# Patient Record
Sex: FEMALE | Race: WHITE
[De-identification: names, ages, dates, MRNs, and addresses within clinical notes are randomized per-mention and may not be internally consistent; named-entity substitution may affect disease eponyms.]

---

## 2019-03-24 ENCOUNTER — HOSPITAL ENCOUNTER (EMERGENCY)
Dept: HOSPITAL 10 - FTE | Age: 39
Discharge: HOME | End: 2019-03-24
Payer: MEDICAID

## 2019-03-24 ENCOUNTER — HOSPITAL ENCOUNTER (EMERGENCY)
Dept: HOSPITAL 91 - FTE | Age: 39
Discharge: HOME | End: 2019-03-24
Payer: MEDICAID

## 2019-03-24 VITALS — DIASTOLIC BLOOD PRESSURE: 98 MMHG | HEART RATE: 79 BPM | SYSTOLIC BLOOD PRESSURE: 168 MMHG | RESPIRATION RATE: 18 BRPM

## 2019-03-24 VITALS — WEIGHT: 231.49 LBS | BODY MASS INDEX: 53.57 KG/M2 | HEIGHT: 55 IN

## 2019-03-24 DIAGNOSIS — S53.402A: ICD-10-CM
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
## 2019-03-24 DIAGNOSIS — S83.92XA: Primary | ICD-10-CM

## 2019-03-24 DIAGNOSIS — I10: ICD-10-CM
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
## 2019-03-24 PROCEDURE — 99283 EMERGENCY DEPT VISIT LOW MDM: CPT

## 2019-03-24 PROCEDURE — 29505 APPLICATION LONG LEG SPLINT: CPT

## 2019-03-24 PROCEDURE — 73080 X-RAY EXAM OF ELBOW: CPT

## 2019-03-24 PROCEDURE — 73562 X-RAY EXAM OF KNEE 3: CPT

## 2019-03-24 NOTE — ERD
ER Documentation


Chief Complaint


Chief Complaint





LEFT KNEE PAIN AND LEFT ELBOW PAIN FROM A GLF 1 HR PTA.





HPI


This is a 38-year-old female patient that presents to the emergency room with pa


inful left elbow and left knee status post falling while wearing high heels in 


[place] approximately 2 hours PTA.  Patient states she had difficulty getting up 


due to pain now complains of sharp pain in elbow with some numbness to her 


forearm.  Also has pain to her left knee states she is unable to ambulate.  


States she has had ligamentous injuries to this knee in the past.  History 


significant for hypertension and diabetes controlled with oral medications.





ROS


All systems reviewed and are negative except as per history of present illness.





Medications


Home Meds


Active Scripts


Naproxen* (Naprosyn*) 500 Mg Tablet, 500 MG PO BID PRN for PAIN AND/OR 


INFLAMMATION for 10 Days, #20 TAB


   Prov:THOR CARRILLO NP         3/24/19





Allergies


Allergies:  


Coded Allergies:  


     Penicillins (Verified  Allergy, 3/24/19)





PMhx/Soc


History of Surgery:  Yes (C SECTION X 2)


Anesthesia Reaction:  No


Hx Neurological Disorder:  No


Hx Respiratory Disorders:  No


Hx Cardiac Disorders:  Yes (HTN)


Hx Psychiatric Problems:  No


Hx Miscellaneous Medical Probl:  Yes (PRE DM)


Hx Alcohol Use:  No


Hx Substance Use:  No


Hx Tobacco Use:  No


Smoking Status:  Never smoker





Physical Exam


Vitals





Vital Signs


  Date      Temp  Pulse  Resp  B/P (MAP)   Pulse Ox  O2          O2 Flow    FiO2


Time                                                 Delivery    Rate


   3/24/19  98.3     79    18      168/98        98


     14:08                          (121)





Physical Exam


Const:   No acute distress


Head:   Atraumatic 


Eyes:    Normal Conjunctiva


ENT:    Normal External Ears, Nose and Mouth.


Neck:               Full range of motion. No meningismus.


Resp:   Clear to auscultation bilaterally


Cardio:   Regular rate and rhythm, no murmurs


Abd:    Soft, non tender, non distended. Normal bowel sounds


Skin:   No petechiae or rashes


Back:   No midline or flank tenderness


Musculoskeletal: Left Elbow: +pain at epicondyle, no bruising, no swelling, ltd 


ROM at elbow, sensation intake, small abrasion at distal elbow. Left Knee: 


+abrasion over patella, in line, +tenderness over m/l epicondyle, Negative 


Lachman's, posterior drawer, neg varus/valgus


Ext:    No cyanosis, or edema


Neur:   Awake and alert


Psych:    Normal Mood and Affect





Procedures/MDM


This is a 38-year-old female who presents to the emergency room with pain to 


left elbow and left knee





ED COURSE:


The patient was stable throughout ED course. I kept the patient and/or family 


informed of laboratory and diagnostic imaging results throughout the ED course. 








 


DIAGNOSTIC IMAGING:





X-ray left elbow


No acute abnormality seen, no fractures, no dislocations, no joint space 


narrowing





Knee


 left knee radiograph, bones are intact, joint space preserved, soft tissues are


unremarkable


Read by radiologist.











MEDICATIONS GIVEN: 


No medications given as patient states she took ibuprofen 800 mg prior to 


arrival





Patient has musculoskeletal strain of the left elbow and knee.  Due to radiology


results and clinical exam there is a low suspicion for fracture, dislocation, 


hematoma, ligament or meniscal injury.





Patient was fitted with a knee immobilizer to the left knee and Ace wrap and 


splint to left elbow with instructions on care including releasing Ace wrap and 


sling from elbow in 3-4 days and starting to extend and exercise arm.  Patient 


provided with instructions on RICE and signs and symptoms return to emergency 


room.





She instructed to follow-up in 1-2 weeks with primary care provider for 


reassessment of injuries.  Instructed patient that further assessment may 


require more diagnostics or physical therapy.





Active patient on analgesia.





Departure


Diagnosis:  


   Primary Impression:  


   Elbow sprain


   Additional Impression:  


   Knee sprain


Condition:  Stable


Patient Instructions:  Knee Sprain


Referrals:  


[facility]


YOU HAVE RECEIVED A MEDICAL SCREENING EXAM AND THE RESULTS INDICATE THAT YOU DO 


NOT HAVE A CONDITION THAT REQUIRES URGENT TREATMENT IN THE EMERGENCY DEPARTMENT.





FURTHER EVALUATION AND TREATMENT OF YOUR CONDITION CAN WAIT UNTIL YOU ARE SEEN 


IN YOUR DOCTORS OFFICE WITHIN THE NEXT 1-2 DAYS. IT IS YOUR RESPONSIBILITY TO 


MAKE AN APPOINTMENT FOR FOLOW-UP CARE.





IF YOU HAVE A PRIMARY DOCTOR


--you should call your primary doctor and schedule an appointment





IF YOU DO NOT HAVE A PRIMARY DOCTOR YOU CAN CALL OUR PHYSICIAN REFERRAL HOTLINE 


AT


 [phone] 





IF YOU CAN NOT AFFORD TO SEE A PHYSICIAN YOU CAN CHOSE FROM THE FOLLOWING 


[facility]





[facility] [phone] [address]. [facility] [phone] [address] [facility]. [facility] [phone] [address] [facility] [phone]. [facility] [phone] [address] [facility]. [facility] [phone] [address]





Additional Instructions:  


Thank you very much for allowing us to participate in your care. 


Your health and safety is our top priority at [facility].





Call your primary care doctor TOMORROW for an appointment during the next 2-4 


days and bring all the information and medications prescribed. 





Have prescriptions filled and follow precisely the directions on the label. 





If the symptoms get worse and your provider is unavailable, return to the 


Emergency Department immediately.





Use knee immobilizer for comfort.  Use caution when using crutches.  Please 


follow-up with your primary care provider in 1 week for reassessment of knee 


injury.  You may need further assessment, diagnostics, or physical therapy.





Keep Ace wrap on left elbow in sling for 2-3 days after that please stretch and 


extend your elbow and wrist several times a day.





Use Naprosyn or ibuprofen (Advil) for pain, do not use ibuprofen and Naprosyn 


together.











THOR CARRILLO NP              Mar 24, 2019 20:20